# Patient Record
Sex: FEMALE | Race: WHITE
[De-identification: names, ages, dates, MRNs, and addresses within clinical notes are randomized per-mention and may not be internally consistent; named-entity substitution may affect disease eponyms.]

---

## 2017-07-10 ENCOUNTER — HOSPITAL ENCOUNTER (INPATIENT)
Dept: HOSPITAL 36 - ER | Age: 67
LOS: 2 days | Discharge: HOME | DRG: 309 | End: 2017-07-12
Attending: INTERNAL MEDICINE | Admitting: INTERNAL MEDICINE
Payer: MEDICARE

## 2017-07-10 VITALS — DIASTOLIC BLOOD PRESSURE: 54 MMHG | SYSTOLIC BLOOD PRESSURE: 128 MMHG

## 2017-07-10 DIAGNOSIS — Z79.82: ICD-10-CM

## 2017-07-10 DIAGNOSIS — M19.90: ICD-10-CM

## 2017-07-10 DIAGNOSIS — I11.0: ICD-10-CM

## 2017-07-10 DIAGNOSIS — M81.0: ICD-10-CM

## 2017-07-10 DIAGNOSIS — E78.5: ICD-10-CM

## 2017-07-10 DIAGNOSIS — E87.6: ICD-10-CM

## 2017-07-10 DIAGNOSIS — I50.32: ICD-10-CM

## 2017-07-10 DIAGNOSIS — R00.1: Primary | ICD-10-CM

## 2017-07-10 DIAGNOSIS — Z90.49: ICD-10-CM

## 2017-07-10 DIAGNOSIS — Z83.3: ICD-10-CM

## 2017-07-10 DIAGNOSIS — Z82.49: ICD-10-CM

## 2017-07-10 DIAGNOSIS — E66.01: ICD-10-CM

## 2017-07-10 DIAGNOSIS — K21.9: ICD-10-CM

## 2017-07-10 LAB
ALBUMIN/GLOB SERPL: 1.5 {RATIO} (ref 1–1.8)
ALP SERPL-CCNC: 63 U/L (ref 34–104)
ALT SERPL-CCNC: 14 U/L (ref 7–52)
ANION GAP SERPL CALC-SCNC: 7.4 MMOL/L (ref 7–16)
AST SERPL-CCNC: 19 U/L (ref 13–39)
BASOPHILS NFR BLD AUTO: 0.8 % (ref 0–2)
BILIRUB SERPL-MCNC: 0.5 MG/DL (ref 0.3–1)
BUN SERPL-MCNC: 29 MG/DL (ref 7–25)
BUN/CREAT SERPL: 24.2
CALCIUM SERPL-MCNC: 9.6 MG/DL (ref 8.6–10.3)
CHLORIDE SERPL-SCNC: 102 MEQ/L (ref 98–107)
CHOLEST SERPL-MCNC: 197 MG/DL (ref ?–200)
CO2 SERPL-SCNC: 30 MEQ/L (ref 21–31)
CREAT SERPL-MCNC: 1.2 MG/DL (ref 0.6–1.2)
EOSINOPHIL NFR BLD AUTO: 2.4 % (ref 0–5)
ERYTHROCYTE [DISTWIDTH] IN BLOOD BY AUTOMATED COUNT: 14.5 % (ref 11.5–20)
GLOBULIN SER-MCNC: 2.9 GM/DL
GLUCOSE SERPL-MCNC: 93 MG/DL (ref 70–105)
HCT VFR BLD CALC: 38 % (ref 35–45)
HGB BLD-MCNC: 12.9 GM/DL (ref 11.7–16.1)
INR PPP: 0.9 (ref 0.5–1.4)
LYMPHOCYTES NFR BLD AUTO: 27.1 % (ref 20–50)
MCH RBC QN AUTO: 27.5 PG (ref 27–31)
MCHC RBC AUTO-ENTMCNC: 33.8 PG (ref 28–36)
MCV RBC AUTO: 81.2 FL (ref 81–100)
MONOCYTES NFR BLD AUTO: 5.6 % (ref 2–10)
NEUTROPHILS # BLD: 4 TH/CMM (ref 1.8–8)
NEUTROPHILS NFR BLD AUTO: 64.1 % (ref 40–80)
PLATELET # BLD: 222 TH/CMM (ref 150–400)
PMV BLD AUTO: 8.1 FL
POTASSIUM SERPL-SCNC: 3.4 MEQ/L (ref 3.5–5.1)
PROTHROMBIN TIME: 9.3 SECONDS (ref 9.5–11.5)
RBC # BLD AUTO: 4.68 MIL/CMM (ref 3.8–5.2)
SODIUM SERPL-SCNC: 136 MEQ/L (ref 136–145)
TRIGL SERPL-MCNC: 188 MG/DL (ref ?–150)
WBC # BLD AUTO: 6 TH/CMM (ref 4.8–10.8)

## 2017-07-10 PROCEDURE — Z7610: HCPCS

## 2017-07-10 NOTE — DIAGNOSTIC IMAGING REPORT
Exam: Chest single view



HISTORY: Shortness of breath.



Findings:



Upright examination of chest reviewed the study demonstrates no acute

pulmonic infiltrates or effusions. Mediastinal structures midline the

heart is not enlarged costophrenic angles are clear. Bony thorax is

intact.



IMPRESSION:

1. Normal examination of chest.

## 2017-07-10 NOTE — ED PHYSICIAN CHART
Chief Complaint/HPI





- Patient Information


Date Seen:: 07/10/17


Time Seen:: 13:00


Chief Complaint:: RIGHT LEG PAIN


History of Present Illness:: 





THIS IS A 65 YO FEMALE WHO STATES SHE SUDDENLY BECAME WEAK AND SOB JUST PRIOR 

TO ARRIVAL IN THIS ER. SHE DENIES ANY CHEST PAIN, ABDOMINAL PAIN AND HEAD PAIN.

  SHE ADMITS TO A HISTORY OF HYPERTENSION, CHF, ARTHRITIS AND OBESITY. SHE 

DENIES FEVER, COUGH AND SORE THROAT.


Allergies:: 


 Allergies











Allergy/AdvReac Type Severity Reaction Status Date / Time


 


No Known Allergies Allergy   Verified 07/10/17 12:44











Vitals:: 


 Vital Signs - 8 hr











  07/10/17





  12:44


 


Temp 98.3 F


 


HR 51


 


RR 17


 


/64


 


O2 Sat % 98











Historian:: Patient


Review:: Nurse's Note Reviewed





Review of Systems





- Review of Systems


General/Constitutional: No fever, No chills, No weight loss, Weakness, No 

diaphoresis, No edema, No loss of appetite


Skin: No skin lesions, No rash, No bruising


Head: No headache, No light-headedness


Eyes: No loss of vision, No pain, No diplopia


ENT: No earache, No nasal drainage, No sore throat, No tinnitus


Neck: No neck pain, No swelling, No thyromegaly, No stiffness, No mass noted


Cardio Vascular: No chest pain, No palpitations, No PND, No orthopnea, No edema


Pulmonary: SOB, No cough, No sputum, No wheezing


GI: No nausea, No vomiting, No diarrhea, No pain, No melena, No hematochezia, 

No constipation, No hematemesis


G/U: No dysuria, No frequency, No hematuria


Musculoskeletal: No bone or joint pain, No back pain, No muscle pain


Endocrine: No polyuria, No polydipsia


Psychiatric: No prior psych history, No depression, No anxiety, No suicidal 

ideation


Hematopoietic: No bruising, No lymphadenopathy


Allergic/Immuno: No urticaria, No angioedema


Neurological: No syncope, No focal symptoms, No weakness, No paresthesia, No 

headache, No seizure, No dizziness, No confusion, No vertigo





Past Medical History





- Past Medical History


Obtainable: Yes


Past Medical History: HTN, CHF, Dyslipidemia, Arthritis


Social History: Non Smoker, No Alcohol, No Drug Use


Surgical History: Cholecystectomy, other (KNEE SURGERY)





Family Medical History





- Family Member


  ** Mother


History Unknown: Yes





Physical Exam





- Physical Examination


General/Constitutional: Awake, Well-developed, well-nourished, Alert, No 

distress, GCS 15, Non-toxic appearing, Ambulatory


Other Gen/Cons comments:: 





OBESE


Head: Atraumatic


Eyes: Lids, conjuctiva normal, PERRL, EOMI


Skin: Nl inspection, No rash, No skin lesions, No ecchymosis, Well hydrated, No 

lymphadenopathy


ENMT: External ears, nose nl, Nasal exam nl, Lips, teeth, gums nl


Neck: Nontender, Full ROM w/o pain, No JVD, No nuchal rigidity, No bruit, No 

mass, No stridor


Respiratory: Nl effort/Exclusion, Clear to Auscultation, No Wheeze/Rhonchi/Rales


Cardio Vascular: RRR, No murmur, gallop, rubs, NL S1 S2


GI: No tenderness/rebounding/guarding, No organomegaly, No hernia, Normal BS's, 

Nondistended, No mass/bruits, No McBurney tenderness


: No CVA tenderness


Extremities: No tenderness or effusion, Full ROM, normal strength in all 

extremities, Normal digits & nails


Other Extremities comments:: 





BILATERAL 2+ PITTING EDEMA


Neuro/Psych: Alert/oriented, DTR's symmetric, Normal sensory exam, Normal motor 

strength, Judgement/insight normal, Mood normal, Normal gait, No focal deficits


Misc: normal gait, Normal back, No paraspinal tenderness





Labs/Radiology/EKG Results





- Lab Results


Results: 


 Laboratory Tests











  07/10/17





  12:35


 


WBC  6.0


 


RBC  4.68


 


Hgb  12.9


 


Hct  38.0


 


MCV  81.2


 


MCH  27.5


 


MCHC Differential  33.8


 


RDW  14.5


 


Plt Count  222


 


MPV  8.1


 


Neutrophils %  64.1


 


Lymphocytes %  27.1


 


Monocytes %  5.6


 


Eosinophils %  2.4


 


Basophils %  0.8








 Abnormal Lab Results











  07/10/17 07/10/17 07/10/17





  12:35 12:35 12:35


 


WBC    6.0


 


RBC    4.68


 


Hgb    12.9


 


Hct    38.0


 


MCV    81.2


 


MCH    27.5


 


MCHC Differential    33.8


 


RDW    14.5


 


Plt Count    222


 


MPV    8.1


 


Neutrophils %    64.1


 


Lymphocytes %    27.1


 


Monocytes %    5.6


 


Eosinophils %    2.4


 


Basophils %    0.8


 


PT  9.3 L  


 


INR  0.90  


 


PTT (Actin FS)  21.1 L  


 


Sodium   


 


Potassium   


 


Chloride   


 


Carbon Dioxide   


 


Anion Gap   


 


BUN   


 


Creatinine   


 


Est GFR ( Amer)   


 


Est GFR (Non-Af Amer)   


 


BUN/Creatinine Ratio   


 


Glucose   


 


Calcium   


 


Total Bilirubin   


 


AST   


 


ALT   


 


Alkaline Phosphatase   


 


Troponin I   


 


Total Protein   


 


Albumin   


 


Globulin   


 


Albumin/Globulin Ratio   


 


Triglycerides   188 H 


 


Cholesterol   197 


 


LDL Cholesterol Direct   116 


 


HDL Cholesterol   43 














  07/10/17 07/10/17





  12:35 12:35


 


WBC  


 


RBC  


 


Hgb  


 


Hct  


 


MCV  


 


MCH  


 


MCHC Differential  


 


RDW  


 


Plt Count  


 


MPV  


 


Neutrophils %  


 


Lymphocytes %  


 


Monocytes %  


 


Eosinophils %  


 


Basophils %  


 


PT  


 


INR  


 


PTT (Actin FS)  


 


Sodium  136 


 


Potassium  3.4 L 


 


Chloride  102 


 


Carbon Dioxide  30.0 


 


Anion Gap  7.4 


 


BUN  29 H 


 


Creatinine  1.2 


 


Est GFR ( Amer)  58.0 


 


Est GFR (Non-Af Amer)  47.9 


 


BUN/Creatinine Ratio  24.2 


 


Glucose  93 


 


Calcium  9.6 


 


Total Bilirubin  0.5 


 


AST  19 


 


ALT  14 


 


Alkaline Phosphatase  63 


 


Troponin I   0.02


 


Total Protein  7.1 


 


Albumin  4.2 


 


Globulin  2.9 


 


Albumin/Globulin Ratio  1.5 


 


Triglycerides  


 


Cholesterol  


 


LDL Cholesterol Direct  


 


HDL Cholesterol  














ED Septic Shock





- .


Is Septic Shock (SBP<90, OR Lactate>4 mmol\L) present?: No





- <6hrs of presentation:


Vital Signs: 


 Vital Signs - 8 hr











  07/10/17





  12:44


 


Temp 98.3 F


 


HR 51


 


RR 17


 


/64


 


O2 Sat % 98

## 2017-07-10 NOTE — DIAGNOSTIC IMAGING REPORT
CT scan of the brain without intravenous contrast



HISTORY: Shortness of breath



Total DLP equals 761



CTDI equals 40.1



Axial sections were obtained from the base of the skull to the vertex.



There is prominence/enlargement of the ventricular system size.

Associated enlargement of cerebral sulci and subarachnoid cisterns.

Findings are consistent with changes of generalized cerebral atrophy. No

acute parenchymal abnormalities. No acute cerebral hemorrhage.

Hypodensity is seen within the supratentorial white matter regions

without mass effect. The findings may be associated with chronic small

vessel ischemic disease. No extra-axial masses or abnormal fluid

collections.



IMPRESSION:

1. No acute abnormalities

2. Cerebral atrophy

3. Supratentorial white matter changes that may reflect chronic small

vessel ischemic disease

## 2017-07-11 LAB
ALBUMIN/GLOB SERPL: 1.6 {RATIO} (ref 1–1.8)
ALP SERPL-CCNC: 52 U/L (ref 34–104)
ALT SERPL-CCNC: 13 U/L (ref 7–52)
ANION GAP SERPL CALC-SCNC: 5.9 MMOL/L (ref 7–16)
AST SERPL-CCNC: 17 U/L (ref 13–39)
BASOPHILS NFR BLD AUTO: 1.1 % (ref 0–2)
BILIRUB SERPL-MCNC: 0.5 MG/DL (ref 0.3–1)
BUN SERPL-MCNC: 25 MG/DL (ref 7–25)
BUN/CREAT SERPL: 22.7
CALCIUM SERPL-MCNC: 9.1 MG/DL (ref 8.6–10.3)
CHLORIDE SERPL-SCNC: 103 MEQ/L (ref 98–107)
CHOLEST SERPL-MCNC: 168 MG/DL (ref ?–200)
CO2 SERPL-SCNC: 30.3 MEQ/L (ref 21–31)
CREAT SERPL-MCNC: 1.1 MG/DL (ref 0.6–1.2)
EOSINOPHIL NFR BLD AUTO: 3.4 % (ref 0–5)
ERYTHROCYTE [DISTWIDTH] IN BLOOD BY AUTOMATED COUNT: 14.5 % (ref 11.5–20)
GLOBULIN SER-MCNC: 2.3 GM/DL
GLUCOSE SERPL-MCNC: 109 MG/DL (ref 70–105)
HCT VFR BLD CALC: 33.7 % (ref 35–45)
HGB BLD-MCNC: 11.1 GM/DL (ref 11.7–16.1)
LYMPHOCYTES NFR BLD AUTO: 29.1 % (ref 20–50)
MAGNESIUM SERPL-MCNC: 1.5 MG/DL (ref 1.9–2.7)
MCH RBC QN AUTO: 27.3 PG (ref 27–31)
MCHC RBC AUTO-ENTMCNC: 33 PG (ref 28–36)
MCV RBC AUTO: 82.8 FL (ref 81–100)
MONOCYTES NFR BLD AUTO: 5.6 % (ref 2–10)
NEUTROPHILS # BLD: 3.7 TH/CMM (ref 1.8–8)
NEUTROPHILS NFR BLD AUTO: 60.8 % (ref 40–80)
PLATELET # BLD: 199 TH/CMM (ref 150–400)
PMV BLD AUTO: 8.2 FL
POTASSIUM SERPL-SCNC: 3.2 MEQ/L (ref 3.5–5.1)
RBC # BLD AUTO: 4.07 MIL/CMM (ref 3.8–5.2)
SODIUM SERPL-SCNC: 136 MEQ/L (ref 136–145)
TRIGL SERPL-MCNC: 142 MG/DL (ref ?–150)
WBC # BLD AUTO: 6 TH/CMM (ref 4.8–10.8)

## 2017-07-11 RX ADMIN — PANTOPRAZOLE SODIUM SCH MG: 40 TABLET, DELAYED RELEASE ORAL at 08:52

## 2017-07-11 RX ADMIN — POTASSIUM CHLORIDE SCH MEQ: 20 SOLUTION ORAL at 08:52

## 2017-07-11 RX ADMIN — ASPIRIN 81 MG SCH MG: 81 TABLET ORAL at 08:51

## 2017-07-11 NOTE — DIAGNOSTIC IMAGING REPORT
Right lower extremity Doppler venous ultrasound exam



HISTORY: Pain/swelling



Sonographic sector images were obtained through the deep venous systems

of the right leg. Associated Doppler data was obtained.



The exam demonstrates patency of the common femoral, superficial

femoral, popliteal, and posterior tibial veins. Specifically, no

thrombus is seen. There are normal compressibility and augmentation

responses.



IMPRESSION: Negative exam for deep vein thrombophlebitis.

## 2017-07-11 NOTE — HISTORY AND PHYSICAL
History of Present Illness





- HPI


Chief Complaint: 





Feeling weak and shortness of breath and dizzy.


HPI: 





66 yrs female with CHF,HTN,Obesity and DJD presented to ER for evaluation of 

above symptoms.


Patient was noted to have symptomatic bradycardia ,patient is now admitted for 

evaluation and possible need for pacemaker.


Vital Signs: 





 Last Vital Signs











Temp  97.6 F   07/11/17 04:00


 


Pulse  62   07/11/17 04:00


 


Resp  18   07/11/17 04:00


 


BP  110/55   07/11/17 04:00


 


Pulse Ox  97   07/11/17 04:00














Past Medical History


Cardiovascular: Report: CHF, HTN


Pulmonary: Report: No Pertinent Hx


CNS: Report: No Pertinent Hx


GI: Report: GERD, Gastritis


Psych: Report: No Pertinent Hx


Musculoskeletal: Report: Other (DJD)


Rheumatologic: Report: No pertinent Hx


Infectious Disease: Report: No Pertinent Hx


Renal/: Report: No Pertinent Hx


Endocrine: Report: No Pertinent Hx


Dermatology: Report: No Pertinent Hx





- Past Surgical History


Past Surgical History: Cholecystectomy, Total Knee Replacement





Family Medical History





- Family Member


  ** Mother


History Unknown: Yes


Ethnicity: 


Hx Family Cancer: No


Hx Family Coronary Artery Disease: No


Hx Family Congestive Heart Failure: No


Hx Family Hypertension: Yes


Hx Family Diabetes: Yes





Social History


Smoke: No


Alcohol: None


Drugs: None


Lives: With Family


Domestic Violence: Negative





- Medications


Home Medications: 


Home Medication











 Medication  Instructions  Recorded  Type


 


Aspirin [Aspirin Chewable] 81 mg PO DAILY 07/10/17 History


 


Benazepril [Lotensin] 20 mg PO DAILY 07/10/17 History


 


Bisacodyl [Topcare Laxative] 5 mg PO HS 07/10/17 History


 


Ibuprofen 800 mg PO Q8H 07/10/17 History


 


Loratadine [Claritin] 10 mg PO DAILY 07/10/17 History


 


Metolazone 5 mg PO DAILY 07/10/17 History


 


Pantoprazole [Protonix] 40 mg PO DAILY 07/10/17 History


 


Potassium Chloride 16 meq PO DAILY 07/10/17 History


 


Temazepam [Restoril] 30 mg PO HS 07/10/17 History














- Allergies


Allergies/Adverse Reactions: 


 Allergies











Allergy/AdvReac Type Severity Reaction Status Date / Time


 


No Known Allergies Allergy   Verified 07/10/17 12:44














Review of Systems





- Review of Systems


Constitutional: Report: No Significant


Eyes: Report: No Significant


ENT: Report: No Significant


Respiratory: Report: SOB with Excertion


Cardiovascular: Report: Chest Pain, Light Headedness


Gastrointestinal: Report: Nausea


Genitourinary: Report: No Significant


Musculoskeletal: Report: No Significant


Skin: Report: No Significant


Neurological: Report: No Significant





Physical Exam





- Physical Exam


HEENT: Report: Ears Nose Throat within normal limits


Neck: Report: Within normal limits


Cardiovascular Systems: Report: Systolic Murmur, Bradycardia


Respiratory: Report: Breath Sounds are within normal limits


Abdomen: Report: Non-tender to palpation


Back: Report: Inspection of back is within normal limits.


Extremities: Report: Non-tender to palpation., Patient had full range of motion

, Pedal edema was noted on inspection


Skin: Report: Color of skin is within normal limits


Neuro/Psych: Report: Mood affect is within normal limits





- Lab Results


All Lab Results last 24 hours: 





 Laboratory Last Values











WBC  6.0 Th/cmm (4.8-10.8)   07/10/17  12:35    


 


RBC  4.68 Mil/cmm (3.80-5.20)   07/10/17  12:35    


 


Hgb  12.9 gm/dL (11.7-16.1)   07/10/17  12:35    


 


Hct  38.0 % (35.0-45.0)   07/10/17  12:35    


 


MCV  81.2 fl ()   07/10/17  12:35    


 


MCH  27.5 pg (27.0-31.0)   07/10/17  12:35    


 


MCHC Differential  33.8 pg (28.0-36.0)   07/10/17  12:35    


 


RDW  14.5 % (11.5-20.0)   07/10/17  12:35    


 


Plt Count  222 Th/cmm (150-400)   07/10/17  12:35    


 


MPV  8.1 fl  07/10/17  12:35    


 


Neutrophils %  64.1 % (40.0-80.0)   07/10/17  12:35    


 


Lymphocytes %  27.1 % (20.0-50.0)   07/10/17  12:35    


 


Monocytes %  5.6 % (2.0-10.0)   07/10/17  12:35    


 


Eosinophils %  2.4 % (0.0-5.0)   07/10/17  12:35    


 


Basophils %  0.8 % (0.0-2.0)   07/10/17  12:35    


 


PT  9.3 SECONDS (9.5-11.5)  L  07/10/17  12:35    


 


INR  0.90  (0.5-1.4)   07/10/17  12:35    


 


PTT (Actin FS)  21.1 SECONDS (26.0-38.0)  L  07/10/17  12:35    


 


Sodium  136 mEq/L (136-145)   07/10/17  12:35    


 


Potassium  3.4 mEq/L (3.5-5.1)  L  07/10/17  12:35    


 


Chloride  102 mEq/L ()   07/10/17  12:35    


 


Carbon Dioxide  30.0 mEq/L (21.0-31.0)   07/10/17  12:35    


 


Anion Gap  7.4  (7.0-16.0)   07/10/17  12:35    


 


BUN  29 mg/dL (7-25)  H  07/10/17  12:35    


 


Creatinine  1.2 mg/dL (0.6-1.2)   07/10/17  12:35    


 


Est GFR ( Amer)  58.0 ml/min (>90)   07/10/17  12:35    


 


Est GFR (Non-Af Amer)  47.9 ml/min  07/10/17  12:35    


 


BUN/Creatinine Ratio  24.2   07/10/17  12:35    


 


Glucose  93 mg/dL ()   07/10/17  12:35    


 


Calcium  9.6 mg/dL (8.6-10.3)   07/10/17  12:35    


 


Total Bilirubin  0.5 mg/dL (0.3-1.0)   07/10/17  12:35    


 


AST  19 U/L (13-39)   07/10/17  12:35    


 


ALT  14 U/L (7-52)   07/10/17  12:35    


 


Alkaline Phosphatase  63 U/L ()   07/10/17  12:35    


 


Troponin I  0.02 ng/mL (0.01-0.05)   07/10/17  20:49    


 


Total Protein  7.1 gm/dL (6.0-8.3)   07/10/17  12:35    


 


Albumin  4.2 gm/dL (3.7-5.3)   07/10/17  12:35    


 


Globulin  2.9 gm/dL  07/10/17  12:35    


 


Albumin/Globulin Ratio  1.5  (1.0-1.8)   07/10/17  12:35    


 


Triglycerides  188 mg/dL (<150)  H  07/10/17  12:35    


 


Cholesterol  197 mg/dL (<200)   07/10/17  12:35    


 


LDL Cholesterol Direct  116 mg/dL ()   07/10/17  12:35    


 


HDL Cholesterol  43 mg/dL (23-92)   07/10/17  12:35    


 


RPR  NONREACTIVE  (NONREACTIVE)   07/10/17  12:35    








 Laboratory Results - last 24 hr











  07/10/17





  20:49


 


Troponin I  0.02














- Assessment


Assessment: 





 Current Active Problems











Problem Status Onset


 


LEFT ARM/LEG PAIN WITH WEAKNESS Acute  








Acute weakness and Dyspnea.


Symptomatic bradycardia


Bilateral leg edema


Hypertension


GERD


DJD


Obesity


CHF by history





- Plan


Plan: 





Stat Bilateral LE doppler.


D dimer


Cardio consult


Cardiac enzymes


Cardiac monitoring


General nursing care]


Med reconcilation


Follow lab


Follow further investigation and give recommendations


care plan reviewed with Staff.

## 2017-07-12 LAB
ANION GAP SERPL CALC-SCNC: 4.8 MMOL/L (ref 7–16)
BUN SERPL-MCNC: 23 MG/DL (ref 7–25)
BUN/CREAT SERPL: 20.9
CALCIUM SERPL-MCNC: 9.4 MG/DL (ref 8.6–10.3)
CHLORIDE SERPL-SCNC: 103 MEQ/L (ref 98–107)
CO2 SERPL-SCNC: 32.5 MEQ/L (ref 21–31)
CREAT SERPL-MCNC: 1.1 MG/DL (ref 0.6–1.2)
GLUCOSE SERPL-MCNC: 100 MG/DL (ref 70–105)
POTASSIUM SERPL-SCNC: 3.3 MEQ/L (ref 3.5–5.1)
SODIUM SERPL-SCNC: 137 MEQ/L (ref 136–145)

## 2017-07-12 RX ADMIN — ASPIRIN 81 MG SCH MG: 81 TABLET ORAL at 09:01

## 2017-07-12 RX ADMIN — PANTOPRAZOLE SODIUM SCH MG: 40 TABLET, DELAYED RELEASE ORAL at 08:56

## 2017-07-12 RX ADMIN — POTASSIUM CHLORIDE SCH MEQ: 20 SOLUTION ORAL at 08:58

## 2017-07-12 NOTE — CONSULTATION
Consult Note





- Consult Note


Service Date: 07/11/17


Referring Physician: Jerson Simmons


Consult Note: 


PHYSICIAN Consultation Note:





Date of Admission: 07/10/17





Purpose of Consultation: Bradycardia





Chief Complaint: Patient complaining of shortness of breath and weakness 

tiredness no chest pain





History of Present Illness:


Patient MARIO WALKER was admitted to [facility] Telemetry with SYMPTOMATIC 

BRADYCARDIA.





Past Medical History: 





Diagnoses





OBESITY, UNSPECIFIED (07/10/17)


HYPERLIPIDEMIA, UNSPECIFIED (07/10/17)


HYPOKALEMIA (07/10/17)


ESSENTIAL (PRIMARY) HYPERTENSION (07/10/17)


HEART FAILURE, UNSPECIFIED (07/10/17)


GASTRO-ESOPHAGEAL REFLUX DISEASE WITHOUT ESOPHAGITIS (07/10/17)


UNSPECIFIED OSTEOARTHRITIS, UNSPECIFIED SITE (07/10/17)


BRADYCARDIA, UNSPECIFIED (07/10/17)


SHORTNESS OF BREATH (07/10/17)


BODY MASS INDEX (BMI) 50-59.9 , ADULT (07/10/17)





Allergies











Allergy/AdvReac Type Severity Reaction Status Date / Time


 


No Known Allergies Allergy   Verified 07/10/17 12:44








 Vital Signs











Temp  97.0 F   07/12/17 04:00


 


Pulse  55   07/12/17 08:56


 


Resp  18   07/12/17 08:00


 


BP  130/83   07/12/17 08:56


 


Pulse Ox  94   07/12/17 04:00








 Intake & Output











 07/11/17 07/12/17 07/12/17





 18:59 06:59 18:59


 


Intake Total 350 340 


 


Balance 350 340 


 


Weight (lbs) 133.81 kg 133.265 kg 


 


Intake:   


 


  Oral 350 340 


 


Other:   


 


  # Voids 4 3 


 


  # Bowel Movements 4 0 








 Laboratory Results - last 24 hr











  07/11/17 07/12/17 07/12/17





  14:15 05:20 05:20


 


Sodium   137 


 


Potassium   3.3 L 


 


Chloride   103 


 


Carbon Dioxide   32.5 H 


 


Anion Gap   4.8 L 


 


BUN   23 


 


Creatinine   1.1 


 


Est GFR ( Amer)   > 60.0 


 


Est GFR (Non-Af Amer)   53.0 


 


BUN/Creatinine Ratio   20.9 


 


Glucose   100 


 


Calcium   9.4 


 


Troponin I  0.01  


 


B-Natriuretic Peptide    8.7








Home Medication











 Medication  Instructions  Recorded  Type


 


Aspirin [Aspirin Chewable] 81 mg PO DAILY 07/10/17 History


 


Benazepril [Lotensin] 20 mg PO DAILY 07/10/17 History


 


Bisacodyl [Topcare Laxative] 5 mg PO HS 07/10/17 History


 


Loratadine [Claritin] 10 mg PO DAILY 07/10/17 History


 


Metolazone 5 mg PO DAILY 07/10/17 History


 


Pantoprazole [Protonix] 40 mg PO DAILY 07/10/17 History


 


Potassium Chloride 16 meq PO DAILY 07/10/17 History


 


Temazepam [Restoril] 30 mg PO HS 07/10/17 History








Current Medications











Generic Name Dose Route Start Last Admin





  Trade Name Freq  PRN Reason Stop Dose Admin


 


Acetaminophen  500 mg  07/11/17 11:51  07/12/17 08:57





  Tylenol Extra Strength  PO  09/09/17 11:50  500 mg





  Q6H PRN   Administration





  Pain (Mild)   


 


Aspirin  81 mg  07/11/17 09:00  07/12/17 09:01





  Aspirin Chewable  PO  09/09/17 08:59  81 mg





  DAILY JOHN   Administration


 


Benazepril HCl  20 mg  07/11/17 09:00  07/12/17 08:56





  Lotensin  PO  09/09/17 08:59  20 mg





  DAILY JOHN   Administration


 


Bisacodyl  5 mg  07/10/17 21:00  07/11/17 21:38





  Dulcolax 5 Mg Ec Tab  PO  09/08/17 20:59  Not Given





  HS JOHN   


 


Loratadine  10 mg  07/11/17 09:00  07/12/17 08:57





  Claritin  PO  09/09/17 08:59  10 mg





  DAILY JOHN   Administration


 


Metolazone  5 mg  07/11/17 09:00  07/12/17 08:56





  Zaroxolyn  PO  09/09/17 08:59  5 mg





  DAILY JOHN   Administration


 


Miscellaneous  1 ea  07/11/17 11:44  





  Vte Chemical Prophylaxis Screen/ Admission    09/09/17 11:43  





  PRN PRN   





  PROTOCOL   


 


Pantoprazole Sodium  40 mg  07/11/17 09:00  07/12/17 08:56





  Protonix  PO  09/09/17 08:59  40 mg





  DAILY JOHN   Administration


 


Potassium Chloride  16 meq  07/11/17 09:00  07/12/17 08:58





  Potassium Chloride Elixir  PO  09/09/17 08:59  16 meq





  DAILY JOHN   Administration


 


Temazepam  30 mg  07/10/17 21:00  07/11/17 21:37





  Restoril  PO  09/08/17 20:59  30 mg





  HS JOHN   Administration








Review of Systems:


A 12 point ROS was reviewed with the pertinent positive and negatives noted in 

the HPI.  Shortness of breath minimal swelling in the legs





Social History





Smoking Status                   Unknown if ever smoked


Drug Use                         No


Alcohol Use                      No





Physical Exam:





General: Alert and Oriented x3, minimal shortness of breath





HEENT: EOMI Bilaterally, PERRLA Bilaterally, Head is normocephalic, atraumatic 

on inspection.





Cardio: Sinus bradycardia S1-S2 no S3 soft S4.  Systolic murmur





Respiratory: Bilateral occasional wheeze





Abdominal: Soft, Nondistended, Nontender to palpation x 4 quadrants





Genital/Urinary: No urinary incontinence





Extremities: Made minimal pedal edema





Neurological: Cranial Nerves II-XII intact bilaterally, Gait Steady, No Focal 

Deficits noted.





Assessment/Plan: 


Sinus bradycardia


Hypertension


Morbid obesity


Congestive heart failure diastolic dysfunction and chronic


Osteoporosis


Patient at the present time he'll be on a mild monitor bed we will get TSH 

level echocardiogram








Signed,





Jase Arreguin.


07/12/[id number]

## 2017-07-12 NOTE — CARDIOLOGY
Cardiology Report





- Cardiology


Cardiology: 


Date of Service: 


07/11/2017


Patient of DR. Dr. tashi nicole





M-MODE ECHOCARDIOLGRAM: Mitral valve normal left ventricle normal ejection 

fraction 60% left atrium enlargement 4.5 cm aortic root "normal aortic leaflets 

normal








CONCLUSION: Left atrium enlarged ejection fraction 60%








2D ECHO: Long axis mitral valve normal left ventricle normal ejection fraction 

60% left atrium enlarged at 5 cm aortic root normal aortic leaflets normal


Short axis mitral valve aortic valve normal


Apical 4 chamber mitral valve normal left ventricle normal left atrium is 

enlarged right ventricular cavity normal right atrium normal








CONCLUSION: Left atrium enlarged ejection fraction 60%








DOPPLER: Trace mitral regurgitation and trace tricuspid regurgitation and right 

ventricular systolic pressure 31 mmHg








CONCLUSION: Trace mitral regurgitation trace tricuspid regurgitation left 

atrium enlarged ejection fraction 60%

## 2018-12-08 NOTE — DISCHARGE SUMMARY
General Discharge Summary





- Discharge Summary


Date of Admission: 07/10/17


Admitting Diagnosis: weakness and dizziness


Patient Problems: 


All Active Problems





LEFT ARM/LEG PAIN WITH WEAKNESS (Acute) 








Discharge Date: 07/12/17


Discharge Diagnosis: sinus bradycardia.  Hypertension.  Obesity.  Diastolic 

dysfunction.  CHF


Laboratory Findings: 


 Laboratory Tests











  07/10/17 07/11/17 07/11/17





  20:49 06:30 06:30


 


WBC    6.0


 


RBC    4.07


 


Hgb    11.1 L


 


Hct    33.7 L D


 


MCV    82.8


 


MCH    27.3


 


MCHC Differential    33.0


 


RDW    14.5


 


Plt Count    199


 


MPV    8.2


 


Neutrophils %    60.8


 


Lymphocytes %    29.1


 


Monocytes %    5.6


 


Eosinophils %    3.4


 


Basophils %    1.1


 


D-Dimer   


 


Sodium   


 


Potassium   


 


Chloride   


 


Carbon Dioxide   


 


Anion Gap   


 


BUN   


 


Creatinine   


 


Est GFR ( Amer)   


 


Est GFR (Non-Af Amer)   


 


BUN/Creatinine Ratio   


 


Glucose   


 


Calcium   


 


Magnesium   


 


Total Bilirubin   


 


AST   


 


ALT   


 


Alkaline Phosphatase   


 


Troponin I  0.02  0.01 


 


B-Natriuretic Peptide   


 


Total Protein   


 


Albumin   


 


Globulin   


 


Albumin/Globulin Ratio   


 


Triglycerides   


 


Cholesterol   


 


LDL Cholesterol Direct   


 


HDL Cholesterol   














  07/11/17 07/11/17 07/12/17





  06:30 14:15 05:20


 


WBC   


 


RBC   


 


Hgb   


 


Hct   


 


MCV   


 


MCH   


 


MCHC Differential   


 


RDW   


 


Plt Count   


 


MPV   


 


Neutrophils %   


 


Lymphocytes %   


 


Monocytes %   


 


Eosinophils %   


 


Basophils %   


 


D-Dimer  219  


 


Sodium  136   137


 


Potassium  3.2 L   3.3 L


 


Chloride  103   103


 


Carbon Dioxide  30.3   32.5 H


 


Anion Gap  5.9 L   4.8 L


 


BUN  25   23


 


Creatinine  1.1   1.1


 


Est GFR ( Amer)  > 60.0   > 60.0


 


Est GFR (Non-Af Amer)  53.0   53.0


 


BUN/Creatinine Ratio  22.7   20.9


 


Glucose  109 H   100


 


Calcium  9.1   9.4


 


Magnesium  1.5 L  


 


Total Bilirubin  0.5  


 


AST  17  


 


ALT  13  


 


Alkaline Phosphatase  52  


 


Troponin I   0.01 


 


B-Natriuretic Peptide   


 


Total Protein  5.9 L  


 


Albumin  3.6 L  


 


Globulin  2.3  


 


Albumin/Globulin Ratio  1.6  


 


Triglycerides  142  


 


Cholesterol  168  


 


LDL Cholesterol Direct  104  


 


HDL Cholesterol  34  














  07/12/17





  05:20


 


WBC 


 


RBC 


 


Hgb 


 


Hct 


 


MCV 


 


MCH 


 


MCHC Differential 


 


RDW 


 


Plt Count 


 


MPV 


 


Neutrophils % 


 


Lymphocytes % 


 


Monocytes % 


 


Eosinophils % 


 


Basophils % 


 


D-Dimer 


 


Sodium 


 


Potassium 


 


Chloride 


 


Carbon Dioxide 


 


Anion Gap 


 


BUN 


 


Creatinine 


 


Est GFR ( Amer) 


 


Est GFR (Non-Af Amer) 


 


BUN/Creatinine Ratio 


 


Glucose 


 


Calcium 


 


Magnesium 


 


Total Bilirubin 


 


AST 


 


ALT 


 


Alkaline Phosphatase 


 


Troponin I 


 


B-Natriuretic Peptide  8.7


 


Total Protein 


 


Albumin 


 


Globulin 


 


Albumin/Globulin Ratio 


 


Triglycerides 


 


Cholesterol 


 


LDL Cholesterol Direct 


 


HDL Cholesterol 











Hospital Course: 





66-year-old female presented to the emergency room for evaluation of weakness 

dizziness.  Patient was seen by emergency room M.D. and noted to bradycardia.  

Patient was admitted to hospital for evaluation of her symptoms.  Patient was 

seen by cardiologist as well as myself.  Patient's also had lower extremity 

venous Doppler studies which was negative for deep venous thrombosis.  2-D 

echocardiogram was performed which did remarkable for LVH and diastolic 

dysfunction.  Patient remained asymptomatic with heart rate of follow-up 50s 

and 55 strength while she was awake.  Patient's thyroid functions were also 

normal.  I did discuss with cardiologist about discharge planning is 

considering patient was asymptomatic and no plan to have any further 

intervention.  Patient is discharged home in stable conditions today with 

continuation of same medication as patient was receiving at home.  Patient was 

advised to see patient's primary care doctor and cardiologist in 1-2 weeks.  If 

recurrent symptoms patient is advised to go to the emergency room.


Treatment: 





Cardiac monitoring, general nursing care, witnessed Doppler studies of the 

lower extremity, 2-D echocardiogram


Condition at Discharge: Stable


Disposition: PT DISCHARGED HOME


Home Medications: 


Home Medication











 Medication  Instructions  Recorded  Type


 


Aspirin [Aspirin Chewable] 81 mg PO DAILY 07/10/17 History


 


Benazepril [Lotensin] 20 mg PO DAILY 07/10/17 History


 


Bisacodyl [Topcare Laxative] 5 mg PO HS 07/10/17 History


 


Ibuprofen 800 mg PO Q8H 07/10/17 History


 


Loratadine [Claritin] 10 mg PO DAILY 07/10/17 History


 


Metolazone 5 mg PO DAILY 07/10/17 History


 


Pantoprazole [Protonix] 40 mg PO DAILY 07/10/17 History


 


Potassium Chloride 16 meq PO DAILY 07/10/17 History


 


Temazepam [Restoril] 30 mg PO HS 07/10/17 History











Inpatient Medications: 


Current Medications





Acetaminophen (Tylenol Extra Strength)  500 mg PO Q6H PRN


   PRN Reason: Pain (Mild)


   Stop: 09/09/17 11:50


   Last Admin: 07/12/17 08:57 Dose:  500 mg


Aspirin (Aspirin Chewable)  81 mg PO DAILY JOHN


   Stop: 09/09/17 08:59


   Last Admin: 07/12/17 09:01 Dose:  81 mg


Benazepril HCl (Lotensin)  20 mg PO DAILY JOHN


   Stop: 09/09/17 08:59


   Last Admin: 07/12/17 08:56 Dose:  20 mg


Bisacodyl (Dulcolax 5 Mg Ec Tab)  5 mg PO HS JOHN


   Stop: 09/08/17 20:59


   Last Admin: 07/11/17 21:38 Dose:  Not Given


Loratadine (Claritin)  10 mg PO DAILY JOHN


   Stop: 09/09/17 08:59


   Last Admin: 07/12/17 08:57 Dose:  10 mg


Metolazone (Zaroxolyn)  5 mg PO DAILY JOHN


   Stop: 09/09/17 08:59


   Last Admin: 07/12/17 08:56 Dose:  5 mg


Miscellaneous (Vte Chemical Prophylaxis Screen/ Admission)  1 ea [facility] PRN PRN


   PRN Reason: PROTOCOL


   Stop: 09/09/17 11:43


Pantoprazole Sodium (Protonix)  40 mg PO DAILY JOHN


   Stop: 09/09/17 08:59


   Last Admin: 07/12/17 08:56 Dose:  40 mg


Potassium Chloride (Potassium Chloride Elixir)  16 meq PO DAILY JOHN


   Stop: 09/09/17 08:59


   Last Admin: 07/12/17 08:58 Dose:  16 meq


Potassium Chloride (Klor-Con)  20 meq PO X1 ONE


   Stop: 07/12/17 12:07


Temazepam (Restoril)  30 mg PO HS JOHN


   Stop: 09/08/17 20:59


   Last Admin: 07/11/17 21:37 Dose:  30 mg








Consults and Follow-Up: 


Jerson Simmons [Active] - 1-3 Days


Instructions:  Hypokalemia Patient Instructions by Bony Nolasco DO at 02/07/17 02:46 PM     Author:  Bony Nolasco DO Service:  (none) Author Type:  Physician     Filed:  02/07/17 02:46 PM Encounter Date:  2/7/2017 Status:  Signed     :  Bony Nolasco DO (Physician)            -CT scan reviewed with patient and all questions were answered.  -Will consider repeat image in 2 years per patient request  -Follow up in 12 months  -Patient verbalized understanding.       Revision History        User Key Date/Time User Provider Type Action    > [N/A] 02/07/17 02:46 PM Bony Nolasco DO Physician Sign

## 2019-06-22 ENCOUNTER — HOSPITAL ENCOUNTER (EMERGENCY)
Dept: HOSPITAL 36 - ER | Age: 69
Discharge: ANOTHER HOSPITAL | End: 2019-06-22
Payer: MEDICARE

## 2019-06-22 DIAGNOSIS — I11.0: ICD-10-CM

## 2019-06-22 DIAGNOSIS — Z90.49: ICD-10-CM

## 2019-06-22 DIAGNOSIS — N39.0: Primary | ICD-10-CM

## 2019-06-22 DIAGNOSIS — I50.9: ICD-10-CM

## 2019-06-22 DIAGNOSIS — R60.0: ICD-10-CM

## 2019-06-22 DIAGNOSIS — M19.90: ICD-10-CM

## 2019-06-22 DIAGNOSIS — E66.9: ICD-10-CM

## 2019-06-22 DIAGNOSIS — K21.9: ICD-10-CM

## 2019-06-22 DIAGNOSIS — D64.9: ICD-10-CM

## 2019-06-22 LAB
ALBUMIN SERPL-MCNC: 4 GM/DL (ref 3.7–5.3)
ALBUMIN/GLOB SERPL: 1.4 {RATIO} (ref 1–1.8)
ALP SERPL-CCNC: 59 U/L (ref 34–104)
ALT SERPL-CCNC: 17 U/L (ref 7–52)
ANION GAP SERPL CALC-SCNC: 12.4 MMOL/L (ref 7–16)
APPEARANCE UR: CLEAR
AST SERPL-CCNC: 21 U/L (ref 13–39)
BACTERIA #/AREA URNS HPF: (no result) /HPF
BASOPHILS # BLD AUTO: 0 TH/CUMM (ref 0–0.2)
BASOPHILS NFR BLD AUTO: 0.6 % (ref 0–2)
BILIRUB SERPL-MCNC: 0.5 MG/DL (ref 0.3–1)
BILIRUB UR-MCNC: NEGATIVE MG/DL
BUN SERPL-MCNC: 19 MG/DL (ref 7–25)
CALCIUM SERPL-MCNC: 9.3 MG/DL (ref 8.6–10.3)
CHLORIDE SERPL-SCNC: 102 MEQ/L (ref 98–107)
CHOLEST SERPL-MCNC: 134 MG/DL (ref ?–200)
CO2 SERPL-SCNC: 29.3 MEQ/L (ref 21–31)
COLOR UR: YELLOW
CREAT SERPL-MCNC: 1.1 MG/DL (ref 0.6–1.2)
EOSINOPHIL # BLD AUTO: 0.2 TH/CMM (ref 0.1–0.4)
EOSINOPHIL NFR BLD AUTO: 2.9 % (ref 0–5)
EPI CELLS URNS QL MICRO: (no result) /LPF
ERYTHROCYTE [DISTWIDTH] IN BLOOD BY AUTOMATED COUNT: 17.4 % (ref 11.5–20)
GLOBULIN SER-MCNC: 2.9 GM/DL
GLUCOSE SERPL-MCNC: 115 MG/DL (ref 70–105)
GLUCOSE UR STRIP-MCNC: NEGATIVE MG/DL
HCT VFR BLD CALC: 30.6 % (ref 41–60)
HDLC SERPL-MCNC: 44 MG/DL (ref 23–92)
HGB BLD-MCNC: 9.8 GM/DL (ref 12–16)
INR PPP: 0.98 (ref 0.5–1.4)
KETONES UR STRIP-MCNC: NEGATIVE MG/DL
LEUKOCYTE ESTERASE UR-ACNC: (no result)
LYMPHOCYTE AB SER FC-ACNC: 1.6 TH/CMM (ref 1.5–3)
LYMPHOCYTES NFR BLD AUTO: 27.7 % (ref 20–50)
MCH RBC QN AUTO: 23.6 PG (ref 27–31)
MCHC RBC AUTO-ENTMCNC: 32.1 PG (ref 28–36)
MCV RBC AUTO: 73.4 FL (ref 81–100)
MICRO URNS: YES
MONOCYTES # BLD AUTO: 0.4 TH/CMM (ref 0.3–1)
MONOCYTES NFR BLD AUTO: 8 % (ref 2–10)
NEUTROPHILS # BLD: 3.4 TH/CMM (ref 1.8–8)
NEUTROPHILS NFR BLD AUTO: 60.8 % (ref 40–80)
NITRITE UR QL STRIP: NEGATIVE
PH UR STRIP: 6 [PH] (ref 4.6–8)
PLATELET # BLD: 237 TH/CMM (ref 150–400)
POTASSIUM SERPL-SCNC: 3.7 MEQ/L (ref 3.5–5.1)
PROT UR STRIP-MCNC: NEGATIVE MG/DL
RBC # BLD AUTO: 4.17 MIL/CMM (ref 3.8–5.2)
RBC # UR STRIP: NEGATIVE /UL
RBC #/AREA URNS HPF: (no result) /HPF (ref 0–5)
SODIUM SERPL-SCNC: 140 MEQ/L (ref 136–145)
SP GR UR STRIP: 1.02 (ref 1–1.03)
TRIGL SERPL-MCNC: 119 MG/DL (ref ?–150)
URINALYSIS COMPLETE PNL UR: (no result)
UROBILINOGEN UR STRIP-ACNC: 0.2 E.U./DL (ref 0.2–1)
WBC # BLD AUTO: 5.6 TH/CMM (ref 4.8–10.8)
WBC #/AREA URNS HPF: (no result) /HPF (ref 0–5)

## 2019-06-22 PROCEDURE — 83880 ASSAY OF NATRIURETIC PEPTIDE: CPT

## 2019-06-22 PROCEDURE — 84484 ASSAY OF TROPONIN QUANT: CPT

## 2019-06-22 PROCEDURE — 85025 COMPLETE CBC W/AUTO DIFF WBC: CPT

## 2019-06-22 PROCEDURE — 80061 LIPID PANEL: CPT

## 2019-06-22 PROCEDURE — 87086 URINE CULTURE/COLONY COUNT: CPT

## 2019-06-22 PROCEDURE — 80053 COMPREHEN METABOLIC PANEL: CPT

## 2019-06-22 PROCEDURE — 71045 X-RAY EXAM CHEST 1 VIEW: CPT

## 2019-06-22 PROCEDURE — 85610 PROTHROMBIN TIME: CPT

## 2019-06-22 PROCEDURE — 85730 THROMBOPLASTIN TIME PARTIAL: CPT

## 2019-06-22 PROCEDURE — 81001 URINALYSIS AUTO W/SCOPE: CPT

## 2019-06-22 PROCEDURE — 99284 EMERGENCY DEPT VISIT MOD MDM: CPT

## 2019-06-22 PROCEDURE — 82140 ASSAY OF AMMONIA: CPT

## 2019-06-22 PROCEDURE — 86592 SYPHILIS TEST NON-TREP QUAL: CPT

## 2019-06-22 PROCEDURE — 36415 COLL VENOUS BLD VENIPUNCTURE: CPT

## 2019-06-22 PROCEDURE — 93005 ELECTROCARDIOGRAM TRACING: CPT

## 2019-06-22 PROCEDURE — 96372 THER/PROPH/DIAG INJ SC/IM: CPT

## 2019-06-22 PROCEDURE — 87070 CULTURE OTHR SPECIMN AEROBIC: CPT

## 2019-06-22 PROCEDURE — 85379 FIBRIN DEGRADATION QUANT: CPT

## 2019-06-22 PROCEDURE — 84443 ASSAY THYROID STIM HORMONE: CPT

## 2019-06-22 NOTE — ED PHYSICIAN CHART
ED Chief Complaint/HPI





- Patient Information


Date Seen:: 06/22/19


Time Seen:: 16:12


Chief Complaint:: leg wound check


History of Present Illness:: 





this is a 67 yo female severe chf patient with bilateral 4 plus edema and 

severe cellulitis of both lower extremities. she is concerned about the leaking 

from both legs that smells. she is chronically ill in a wheelchair.


Allergies:: 


 Allergies











Allergy/AdvReac Type Severity Reaction Status Date / Time


 


No Known Allergies Allergy   Verified 07/10/17 12:44











Vitals:: 


 Vital Signs - 8 hr











  06/22/19





  16:00


 


Temp 98.1 F


 


HR 65


 


RR 16


 


/55


 


O2 Sat % 99











Historian:: Patient


Review:: Nurse's Note Reviewed, Old Chart Reviewed





ED Review of Systems





- Review of Systems


General/Constitutional: No fever, No chills, No weight loss, No weakness, No 

diaphoresis, Edema, No loss of appetite


Skin: Skin lesions (on the legs), No rash, No bruising


Head: No headache, No light-headedness


Eyes: No loss of vision, No pain, No diplopia


ENT: No earache, No nasal drainage, No sore throat, No tinnitus


Neck: No neck pain, No swelling, No thyromegaly, No stiffness, No mass noted


Cardio Vascular: No chest pain, No palpitations, No PND, No orthopnea, No edema


Pulmonary: No SOB, No cough, No sputum, No wheezing


GI: No nausea, No vomiting, No diarrhea, No pain, No melena, No hematochezia, 

No constipation, No hematemesis


G/U: No dysuria, No frequency, No hematuria


Musculoskeletal: No bone or joint pain, No back pain, No muscle pain


Endocrine: No polyuria, No polydipsia


Psychiatric: No prior psych history, No depression, No anxiety, No suicidal 

ideation


Hematopoietic: No bruising, No lymphadenopathy


Allergic/Immuno: No urticaria, No angioedema


Neurological: No syncope, No focal symptoms, No weakness, No paresthesia, No 

headache, No seizure, No dizziness, No confusion, No vertigo





ED Past Medical History





- Past Medical History


Obtainable: Yes


Past Medical History: HTN, CHF, PUD/GERD, Arthritis


Family History: None


Social History: Non Smoker, No Alcohol, No Drug Use, Homeless


Surgical History: Cholecystectomy, other (total knee replacement)


Psychiatricy History: None


Medication: Reviewed





Family Medical History





- Family Member


  ** Mother


History Unknown: Yes


Ethnicity: 


Hx Family Cancer: No


Hx Family Coronary Artery Disease: No


Hx Family Congestive Heart Failure: No


Hx Family Hypertension: Yes


Hx Family Diabetes: Yes





ED Physical Exam





- Physical Examination


General/Constitutional: Awake, Well-developed, well-nourished, Alert, No 

distress, GCS 15, Non-toxic appearing, Ambulatory


Other Gen/Cons comments:: 





obesity


Head: Atraumatic


Eyes: Lids, conjuctiva normal, PERRL, EOMI


Skin: Nl inspection, No rash, No skin lesions, No ecchymosis, Well hydrated, No 

lymphadenopathy


ENMT: External ears, nose nl, Nasal exam nl, Lips, teeth, gums nl


Neck: Nontender, Full ROM w/o pain, No JVD, No nuchal rigidity, No bruit, No 

mass, No stridor


Respiratory: Nl effort/Exclusion, Clear to Auscultation, No Wheeze/Rhonchi/Rales


Cardio Vascular: RRR, No murmur, gallop, rubs, NL S1 S2


GI: No tenderness/rebounding/guarding, No organomegaly, No hernia, Normal BS's, 

Nondistended, No mass/bruits, No McBurney tenderness


: No CVA tenderness


Extremities: No tenderness or effusion, Full ROM, normal strength in all 

extremities, No edema, Normal digits & nails


Neuro/Psych: Alert/oriented, DTR's symmetric, Normal sensory exam, Normal motor 

strength, Judgement/insight normal, Mood normal, Normal gait, No focal deficits


Misc: Normal back, No paraspinal tenderness





ED Labs/Radiology/EKG Results





- Lab Results


Results: 





 Abnormal Lab Results











  06/22/19 06/22/19 06/22/19





  17:00 17:00 17:00


 


WBC    5.6


 


RBC    4.17


 


Hgb    9.8 L


 


Hct    30.6 L


 


MCV    73.4 L


 


MCH    23.6 L


 


MCHC Differential    32.1


 


RDW    17.4


 


Plt Count    237


 


MPV    7.7


 


Neutrophils %    60.8


 


Lymphocytes %    27.7


 


Monocytes %    8.0


 


Eosinophils %    2.9


 


Basophils %    0.6


 


PT  10.2  


 


INR  0.98  


 


PTT (Actin FS)  26.2  


 


D-Dimer   


 


Sodium   


 


Potassium   


 


Chloride   


 


Carbon Dioxide   


 


Anion Gap   


 


BUN   


 


Creatinine   


 


Est GFR ( Amer)   


 


Est GFR (Non-Af Amer)   


 


BUN/Creatinine Ratio   


 


Glucose   


 


Calcium   


 


Total Bilirubin   


 


AST   


 


ALT   


 


Alkaline Phosphatase   


 


Troponin I   


 


B-Natriuretic Peptide   


 


Total Protein   


 


Albumin   


 


Globulin   


 


Albumin/Globulin Ratio   


 


Triglycerides   119 


 


Cholesterol   134 


 


LDL Cholesterol Direct   71 L 


 


HDL Cholesterol   44 


 


TSH   


 


Urine Source   


 


Urine Color   


 


Urine Clarity   


 


Urine pH   


 


Ur Specific Gravity   


 


Urine Protein   


 


Urine Glucose (UA)   


 


Urine Ketones   


 


Urine Blood   


 


Urine Nitrate   


 


Urine Bilirubin   


 


Urine Urobilinogen   


 


Ur Leukocyte Esterase   


 


Urine RBC   


 


Urine WBC   


 


Ur Epithelial Cells   


 


Urine Bacteria   














  06/22/19 06/22/19 06/22/19





  17:00 17:00 17:00


 


WBC   


 


RBC   


 


Hgb   


 


Hct   


 


MCV   


 


MCH   


 


MCHC Differential   


 


RDW   


 


Plt Count   


 


MPV   


 


Neutrophils %   


 


Lymphocytes %   


 


Monocytes %   


 


Eosinophils %   


 


Basophils %   


 


PT   


 


INR   


 


PTT (Actin FS)   


 


D-Dimer   


 


Sodium  140  


 


Potassium  3.7  


 


Chloride  102  


 


Carbon Dioxide  29.3  


 


Anion Gap  12.4  


 


BUN  19  


 


Creatinine  1.1  


 


Est GFR ( Amer)  > 60.0  


 


Est GFR (Non-Af Amer)  52.7  


 


BUN/Creatinine Ratio  17.3  


 


Glucose  115 H  


 


Calcium  9.3  


 


Total Bilirubin  0.5  


 


AST  21  


 


ALT  17  


 


Alkaline Phosphatase  59  


 


Troponin I   0.01 


 


B-Natriuretic Peptide   


 


Total Protein  6.9  


 


Albumin  4.0  


 


Globulin  2.9  


 


Albumin/Globulin Ratio  1.4  


 


Triglycerides   


 


Cholesterol   


 


LDL Cholesterol Direct   


 


HDL Cholesterol   


 


TSH    0.99


 


Urine Source   


 


Urine Color   


 


Urine Clarity   


 


Urine pH   


 


Ur Specific Gravity   


 


Urine Protein   


 


Urine Glucose (UA)   


 


Urine Ketones   


 


Urine Blood   


 


Urine Nitrate   


 


Urine Bilirubin   


 


Urine Urobilinogen   


 


Ur Leukocyte Esterase   


 


Urine RBC   


 


Urine WBC   


 


Ur Epithelial Cells   


 


Urine Bacteria   














  06/22/19 06/22/19 06/22/19





  17:00 17:00 17:00


 


WBC   


 


RBC   


 


Hgb   


 


Hct   


 


MCV   


 


MCH   


 


MCHC Differential   


 


RDW   


 


Plt Count   


 


MPV   


 


Neutrophils %   


 


Lymphocytes %   


 


Monocytes %   


 


Eosinophils %   


 


Basophils %   


 


PT   


 


INR   


 


PTT (Actin FS)   


 


D-Dimer   695 H 


 


Sodium   


 


Potassium   


 


Chloride   


 


Carbon Dioxide   


 


Anion Gap   


 


BUN   


 


Creatinine   


 


Est GFR ( Amer)   


 


Est GFR (Non-Af Amer)   


 


BUN/Creatinine Ratio   


 


Glucose   


 


Calcium   


 


Total Bilirubin   


 


AST   


 


ALT   


 


Alkaline Phosphatase   


 


Troponin I   


 


B-Natriuretic Peptide  7.9  


 


Total Protein   


 


Albumin   


 


Globulin   


 


Albumin/Globulin Ratio   


 


Triglycerides   


 


Cholesterol   


 


LDL Cholesterol Direct   


 


HDL Cholesterol   


 


TSH   


 


Urine Source    RANDOM


 


Urine Color    YELLOW


 


Urine Clarity    CLEAR


 


Urine pH    6.0


 


Ur Specific Gravity    1.020


 


Urine Protein    NEGATIVE


 


Urine Glucose (UA)    NEGATIVE


 


Urine Ketones    NEGATIVE


 


Urine Blood    NEGATIVE


 


Urine Nitrate    NEGATIVE


 


Urine Bilirubin    NEGATIVE


 


Urine Urobilinogen    0.2


 


Ur Leukocyte Esterase    SMALL H


 


Urine RBC    0-2


 


Urine WBC    6-10 H


 


Ur Epithelial Cells    MODERATE


 


Urine Bacteria    2+ H














- Radiology Results


Results: 





chest x-ray = cm





- EKG Interpretations


EKG Time:: 17:24


Rate & Rhythm: rate = 77, sinus


Axis: left axis





ED Assessment





- Assessment


General Assessment: 





urinary track infection 


severe edema of both lower extremities


obesity


hypertension


anemia








ED Septic Shock





- .


Is Septic Shock (SBP<90, OR Lactate>4 mmol\L) present?: No





- <6hrs of presentation:


Vital Signs: 


 Vital Signs - 8 hr











  06/22/19





  16:00


 


Temp 98.1 F


 


HR 65


 


RR 16


 


/55


 


O2 Sat % 99














ED Reassessment (Disposition)





- Reassessment


Reassessment Condition:: Improved





- Diagnosis


Diagnosis:: 





urinary tract infection


obesity


severe edema of both lower extremities


anemia


hypertension


gerd








- Aftercare/Follow up Instructions


Aftercare/Follow-Up Instructions:: Counseled pt regarding lab results/diagnosis 

& need follow up, Refer to Discharge Instructions, Counseled pt & family 

regarding lab results/diagnosis & need follow up


Notes:: 





the patient was told to follow up with her pmd and cardiologist on MONDAY and 

to decrease her food intake.


Medication Prescribed:: 





[id number]





- Patient Disposition


Discharge/Transfer:: Home


Condition at Disposition:: Improved

## 2019-06-23 NOTE — DIAGNOSTIC IMAGING REPORT
Portable chest x-ray

Time: 



History: Congestion



Allowing for portable technique the heart size is enlarged. No focal

pulmonary parenchymal processes. No hilar or mediastinal abnormalities.



Impression: No acute abnormalities.